# Patient Record
Sex: FEMALE | Race: BLACK OR AFRICAN AMERICAN | Employment: FULL TIME | URBAN - METROPOLITAN AREA
[De-identification: names, ages, dates, MRNs, and addresses within clinical notes are randomized per-mention and may not be internally consistent; named-entity substitution may affect disease eponyms.]

---

## 2017-12-14 ENCOUNTER — ED HISTORICAL/CONVERTED ENCOUNTER (OUTPATIENT)
Dept: OTHER | Age: 22
End: 2017-12-14

## 2018-04-30 ENCOUNTER — ED HISTORICAL/CONVERTED ENCOUNTER (OUTPATIENT)
Dept: OTHER | Age: 23
End: 2018-04-30

## 2018-11-17 ENCOUNTER — ED HISTORICAL/CONVERTED ENCOUNTER (OUTPATIENT)
Dept: OTHER | Age: 23
End: 2018-11-17

## 2018-12-27 ENCOUNTER — ED HISTORICAL/CONVERTED ENCOUNTER (OUTPATIENT)
Dept: OTHER | Age: 23
End: 2018-12-27

## 2019-02-12 ENCOUNTER — ED HISTORICAL/CONVERTED ENCOUNTER (OUTPATIENT)
Dept: OTHER | Age: 24
End: 2019-02-12

## 2019-06-15 ENCOUNTER — ED HISTORICAL/CONVERTED ENCOUNTER (OUTPATIENT)
Dept: OTHER | Age: 24
End: 2019-06-15

## 2019-12-04 ENCOUNTER — ED HISTORICAL/CONVERTED ENCOUNTER (OUTPATIENT)
Dept: OTHER | Age: 24
End: 2019-12-04

## 2020-02-16 ENCOUNTER — ED HISTORICAL/CONVERTED ENCOUNTER (OUTPATIENT)
Dept: OTHER | Age: 25
End: 2020-02-16

## 2020-05-18 ENCOUNTER — ED HISTORICAL/CONVERTED ENCOUNTER (OUTPATIENT)
Dept: OTHER | Age: 25
End: 2020-05-18

## 2020-06-01 ENCOUNTER — ED HISTORICAL/CONVERTED ENCOUNTER (OUTPATIENT)
Dept: OTHER | Age: 25
End: 2020-06-01

## 2020-06-09 ENCOUNTER — ED HISTORICAL/CONVERTED ENCOUNTER (OUTPATIENT)
Dept: OTHER | Age: 25
End: 2020-06-09

## 2020-07-06 ENCOUNTER — ED HISTORICAL/CONVERTED ENCOUNTER (OUTPATIENT)
Dept: OTHER | Age: 25
End: 2020-07-06

## 2020-08-19 ENCOUNTER — ED HISTORICAL/CONVERTED ENCOUNTER (OUTPATIENT)
Dept: OTHER | Age: 25
End: 2020-08-19

## 2020-09-03 ENCOUNTER — ED HISTORICAL/CONVERTED ENCOUNTER (OUTPATIENT)
Dept: OTHER | Age: 25
End: 2020-09-03

## 2020-10-07 ENCOUNTER — HOSPITAL ENCOUNTER (EMERGENCY)
Age: 25
Discharge: HOME | End: 2020-10-07
Attending: EMERGENCY MEDICINE
Payer: COMMERCIAL

## 2020-10-07 VITALS
OXYGEN SATURATION: 100 % | RESPIRATION RATE: 16 BRPM | HEART RATE: 86 BPM | DIASTOLIC BLOOD PRESSURE: 78 MMHG | BODY MASS INDEX: 40.18 KG/M2 | HEIGHT: 66 IN | SYSTOLIC BLOOD PRESSURE: 119 MMHG | WEIGHT: 250 LBS | TEMPERATURE: 98.4 F

## 2020-10-07 DIAGNOSIS — M54.50 LOW BACK PAIN, UNSPECIFIED BACK PAIN LATERALITY, UNSPECIFIED CHRONICITY, UNSPECIFIED WHETHER SCIATICA PRESENT: Primary | ICD-10-CM

## 2020-10-07 LAB
APPEARANCE UR: CLEAR
BACTERIA URNS QL MICRO: NEGATIVE /HPF
BILIRUB UR QL: NEGATIVE
COLOR UR: YELLOW
GLUCOSE UR STRIP.AUTO-MCNC: NEGATIVE MG/DL
HCG UR QL: NEGATIVE
HGB UR QL STRIP: NEGATIVE
KETONES UR QL STRIP.AUTO: NEGATIVE MG/DL
LEUKOCYTE ESTERASE UR QL STRIP.AUTO: NEGATIVE
NITRITE UR QL STRIP.AUTO: NEGATIVE
PH UR STRIP: 5 [PH] (ref 5–8)
PROT UR STRIP-MCNC: NEGATIVE MG/DL
RBC #/AREA URNS HPF: ABNORMAL /HPF (ref 0–5)
SP GR UR REFRACTOMETRY: 1.02 (ref 1–1.03)
UA: UC IF INDICATED,UAUC: ABNORMAL
UROBILINOGEN UR QL STRIP.AUTO: 0.1 EU/DL (ref 0.2–1)
WBC URNS QL MICRO: ABNORMAL /HPF (ref 0–4)

## 2020-10-07 PROCEDURE — 81025 URINE PREGNANCY TEST: CPT

## 2020-10-07 PROCEDURE — 99282 EMERGENCY DEPT VISIT SF MDM: CPT

## 2020-10-07 PROCEDURE — 81001 URINALYSIS AUTO W/SCOPE: CPT

## 2020-10-07 RX ORDER — IBUPROFEN 600 MG/1
600 TABLET ORAL
Qty: 20 TAB | Refills: 0 | Status: SHIPPED | OUTPATIENT
Start: 2020-10-07 | End: 2021-09-05

## 2020-10-08 NOTE — ED PROVIDER NOTES
EMERGENCY DEPARTMENT HISTORY AND PHYSICAL EXAM      Date: 10/7/2020  Patient Name: Ashley Fonseca    History of Presenting Illness     Chief Complaint   Patient presents with    Back Pain       History Provided By: Patient    HPI: Ashley Fonseca, 31 y.o. female with no significant past medical history who presents to the ED with cc of low back pain of insidious onset. No relieving factors or other constitutional symptoms. No dysuria or urinary frequency reported. There are no other complaints, changes, or physical findings at this time. PCP: None    No current facility-administered medications on file prior to encounter. No current outpatient medications on file prior to encounter. Past History     Past Medical History:  History reviewed. No pertinent past medical history. Past Surgical History:  Past Surgical History:   Procedure Laterality Date    HX ORTHOPAEDIC         Family History:  History reviewed. No pertinent family history. Social History:  Social History     Tobacco Use    Smoking status: Current Some Day Smoker    Smokeless tobacco: Never Used   Substance Use Topics    Alcohol use: Not on file    Drug use: Not Currently       Allergies: Allergies   Allergen Reactions    Shellfish Derived Swelling         Review of Systems     Review of Systems   Constitutional: Negative. HENT: Negative. Eyes: Negative. Respiratory: Negative. Cardiovascular: Negative. Gastrointestinal: Negative. Endocrine: Negative. Genitourinary: Negative. Musculoskeletal: Negative. Skin: Negative. Neurological: Negative. Hematological: Negative. Psychiatric/Behavioral: Negative. All other systems reviewed and are negative. Physical Exam     Physical Exam  Vitals signs and nursing note reviewed. Constitutional:       Appearance: Normal appearance. She is normal weight. HENT:      Head: Normocephalic and atraumatic.       Nose: Nose normal.      Mouth/Throat: Mouth: Mucous membranes are moist.      Pharynx: Oropharynx is clear. Eyes:      Extraocular Movements: Extraocular movements intact. Conjunctiva/sclera: Conjunctivae normal.      Pupils: Pupils are equal, round, and reactive to light. Neck:      Musculoskeletal: Normal range of motion and neck supple. Cardiovascular:      Rate and Rhythm: Normal rate and regular rhythm. Pulses: Normal pulses. Heart sounds: Normal heart sounds. Pulmonary:      Effort: Pulmonary effort is normal.      Breath sounds: Normal breath sounds. Abdominal:      General: Abdomen is flat. Palpations: Abdomen is soft. Musculoskeletal: Normal range of motion. Skin:     General: Skin is warm and dry. Capillary Refill: Capillary refill takes less than 2 seconds. Neurological:      General: No focal deficit present. Mental Status: She is alert and oriented to person, place, and time.    Psychiatric:         Mood and Affect: Mood normal.         Behavior: Behavior normal.         Diagnostic Study Results     Labs -  Recent Results (from the past 24 hour(s))   URINALYSIS W/ REFLEX CULTURE    Collection Time: 10/07/20  8:45 PM    Specimen: Urine   Result Value Ref Range    Color Yellow      Appearance Clear Clear      Specific gravity 1.020 1.003 - 1.030      pH (UA) 5.0 5.0 - 8.0      Protein Negative Negative mg/dL    Glucose Negative Negative mg/dL    Ketone Negative Negative mg/dL    Bilirubin Negative Negative      Blood Negative Negative      Urobilinogen 0.1 (L) 0.2 - 1.0 EU/dL    Nitrites Negative Negative      Leukocyte Esterase Negative Negative      WBC 0-4 0 - 4 /hpf    RBC 0-5 0 - 5 /hpf    Bacteria Negative Negative /hpf    UA:UC IF INDICATED Culture not indicated by UA result Culture not indicated by UA result     HCG URINE, QL    Collection Time: 10/07/20  8:45 PM   Result Value Ref Range    HCG urine, QL Negative Negative       Radiologic Studies -     CT Results  (Last 48 hours)    None CXR Results  (Last 48 hours)    None            Medical Decision Making   I am the first provider for this patient. I reviewed the vital signs, available nursing notes, past medical history, past surgical history, family history and social history. Vital Signs-Reviewed the patient's vital signs. No data found. Records Reviewed: Nursing Notes    Provider Notes (Medical Decision Making): Uneventful ED course, clinical improvement with therapy, patient will be discharged to followup with PCP as directed      ED Course:   Initial assessment performed. The patients presenting problems have been discussed, and they are in agreement with the care plan formulated and outlined with them. I have encouraged them to ask questions as they arise throughout their visit. PLAN:  1. Discharge Medication List as of 10/7/2020 11:46 PM        2. Follow-up Information    None       Return to ED if worse     Diagnosis     Clinical Impression:   1. Low back pain, unspecified back pain laterality, unspecified chronicity, unspecified whether sciatica present      Patients ED visit today does not show an emergent medical condition that requires inpatient admission; The diagnosis, treatment plan and the need for follow up have been discussed in detail with the patient and he has been given the opportunity to ask questions. All such questions at this time, have been satisfactorily answered; patient remains in stable condition for discharge.

## 2020-11-18 ENCOUNTER — APPOINTMENT (OUTPATIENT)
Dept: CT IMAGING | Age: 25
End: 2020-11-18
Attending: EMERGENCY MEDICINE
Payer: COMMERCIAL

## 2020-11-18 ENCOUNTER — APPOINTMENT (OUTPATIENT)
Dept: GENERAL RADIOLOGY | Age: 25
End: 2020-11-18
Attending: EMERGENCY MEDICINE
Payer: COMMERCIAL

## 2020-11-18 ENCOUNTER — HOSPITAL ENCOUNTER (EMERGENCY)
Age: 25
Discharge: HOME | End: 2020-11-19
Payer: COMMERCIAL

## 2020-11-18 VITALS
BODY MASS INDEX: 43.39 KG/M2 | RESPIRATION RATE: 18 BRPM | TEMPERATURE: 98.8 F | WEIGHT: 270 LBS | HEIGHT: 66 IN | SYSTOLIC BLOOD PRESSURE: 114 MMHG | HEART RATE: 79 BPM | OXYGEN SATURATION: 99 % | DIASTOLIC BLOOD PRESSURE: 65 MMHG

## 2020-11-18 DIAGNOSIS — M79.675 TOE PAIN, LEFT: ICD-10-CM

## 2020-11-18 DIAGNOSIS — S09.90XA CLOSED HEAD INJURY, INITIAL ENCOUNTER: Primary | ICD-10-CM

## 2020-11-18 DIAGNOSIS — R09.81 SINUS CONGESTION: ICD-10-CM

## 2020-11-18 LAB — HCG UR QL: NEGATIVE

## 2020-11-18 PROCEDURE — 99283 EMERGENCY DEPT VISIT LOW MDM: CPT

## 2020-11-18 PROCEDURE — 36415 COLL VENOUS BLD VENIPUNCTURE: CPT

## 2020-11-18 PROCEDURE — 74011250637 HC RX REV CODE- 250/637: Performed by: NURSE PRACTITIONER

## 2020-11-18 PROCEDURE — 81025 URINE PREGNANCY TEST: CPT

## 2020-11-18 PROCEDURE — 73630 X-RAY EXAM OF FOOT: CPT

## 2020-11-18 RX ORDER — ACETAMINOPHEN 500 MG
1000 TABLET ORAL ONCE
Status: COMPLETED | OUTPATIENT
Start: 2020-11-18 | End: 2020-11-18

## 2020-11-18 RX ORDER — SODIUM CHLORIDE 0.65 %
1 AEROSOL, SPRAY (ML) NASAL AS NEEDED
Qty: 15 ML | Refills: 0 | Status: SHIPPED | OUTPATIENT
Start: 2020-11-18

## 2020-11-18 RX ORDER — LORATADINE 10 MG
10 TABLET,DISINTEGRATING ORAL DAILY
Qty: 30 TAB | Refills: 0 | Status: SHIPPED | OUTPATIENT
Start: 2020-11-18 | End: 2020-12-18

## 2020-11-18 RX ADMIN — ACETAMINOPHEN 1000 MG: 500 TABLET, FILM COATED ORAL at 23:57

## 2020-11-18 NOTE — Clinical Note
[address] [place] [address] EMERGENCY DEPT 
[facility] [address] [address] 
[phone] Work/School Note Date: 11/18/2020 To Whom It May concern: 
 
West Curtis was seen and treated today in the emergency room by the following provider(s): 
Nurse Practitioner: Kate Dennis NP. West Curtis is excused from work/school on 11/19/20 and 11/20/20. She is medically clear to return to work/school on 11/21/2020. Sincerely, Dante Curtis NP

## 2020-11-19 RX ORDER — IBUPROFEN 800 MG/1
800 TABLET ORAL
Qty: 30 TAB | Refills: 0 | Status: SHIPPED | OUTPATIENT
Start: 2020-11-19 | End: 2021-09-05

## 2020-11-19 NOTE — ED PROVIDER NOTES
EMERGENCY DEPARTMENT HISTORY AND PHYSICAL EXAM      Date: 11/18/2020  Patient Name: Geno Connell    History of Presenting Illness     Chief Complaint   Patient presents with    Headache    Toe Pain    Fall       History Provided By: Patient    HPI: Geno Connell, 31 y.o. female with a past medical history significant No significant past medical history presents to the ED with cc of fall yesterday evening while in the shower. She reports hitting the left side of her head on the toilet and foot with pain to the fourth phalangeal.  She reports headache is intermittent 6/10, aching and throbbing. She also complains of pain in her left foot fourth toe with ambulation and when pressure is applied. Denies any loss of consciousness. Reports use of Tylenol with mild improvement. Denies any nausea, vomiting, dizziness, change in vision, or irritability. Denies any bleeding or noticing any swelling to the site. Patient also complains of having nasal congestion and sinus congestion requesting medication to help    There are no other complaints, changes, or physical findings at this time. PCP: None    No current facility-administered medications on file prior to encounter. Current Outpatient Medications on File Prior to Encounter   Medication Sig Dispense Refill    ibuprofen (MOTRIN) 600 mg tablet Take 1 Tab by mouth every six (6) hours as needed for Pain. 20 Tab 0       Past History     Past Medical History:  History reviewed. No pertinent past medical history. Past Surgical History:  Past Surgical History:   Procedure Laterality Date    HX ORTHOPAEDIC         Family History:  History reviewed. No pertinent family history. Social History:  Social History     Tobacco Use    Smoking status: Former Smoker    Smokeless tobacco: Never Used   Substance Use Topics    Alcohol use: Never     Frequency: Never    Drug use: Not Currently       Allergies:   Allergies   Allergen Reactions    Shellfish Derived Swelling         Review of Systems     Review of Systems   Constitutional: Negative for chills and fever. HENT: Positive for congestion. Gastrointestinal: Negative for nausea and vomiting. Musculoskeletal:        Toe pain   Skin: Negative. Neurological: Positive for headaches. Negative for dizziness, syncope and weakness. Psychiatric/Behavioral: Negative. Physical Exam     Physical Exam  Constitutional:       General: She is not in acute distress. Appearance: Normal appearance. She is not ill-appearing. HENT:      Head: Normocephalic and atraumatic. Nose: Nose normal.      Mouth/Throat:      Mouth: Mucous membranes are moist.   Eyes:      Extraocular Movements: Extraocular movements intact. Conjunctiva/sclera: Conjunctivae normal.   Neck:      Musculoskeletal: Normal range of motion and neck supple. Cardiovascular:      Rate and Rhythm: Normal rate and regular rhythm. Pulses: Normal pulses. Pulmonary:      Effort: Pulmonary effort is normal.   Musculoskeletal:         General: Tenderness present. No swelling. Feet:    Skin:     General: Skin is warm and dry. Capillary Refill: Capillary refill takes less than 2 seconds. Neurological:      Mental Status: She is alert and oriented to person, place, and time. Lab and Diagnostic Study Results     Labs -     Recent Results (from the past 12 hour(s))   HCG URINE, QL    Collection Time: 11/18/20  9:40 PM   Result Value Ref Range    HCG urine, QL Negative Negative         Radiologic Studies -   XR Results (most recent):  Results from Hospital Encounter encounter on 11/18/20   XR FOOT LT MIN 3 V    Narrative EXAMINATION: XR FOOT LT MIN 3 V    HISTORY: Left foot trauma    COMPARISON: Left ankle radiographs dated 9/3/2020    FINDINGS: 3 nonweightbearing view(s) of the left foot are submitted. Prior  internal fixation of the distal fibula.  Heterotopic ossification adjacent to  medial malleolus compatible with sequela of remote ligamentous injury. There is  no acute fracture or dislocation. Joint spaces are normal. There is a tiny heel  spur. Impression IMPRESSION:  1. No acute osseous abnormality of the left foot. CT Results  (Last 48 hours)    None        CXR Results  (Last 48 hours)    None            Medical Decision Making   - I am the first provider for this patient. - I reviewed the vital signs, available nursing notes, past medical history, past surgical history, family history and social history. - Initial assessment performed. The patients presenting problems have been discussed, and they are in agreement with the care plan formulated and outlined with them. I have encouraged them to ask questions as they arise throughout their visit. Vital Signs-Reviewed the patient's vital signs. Patient Vitals for the past 12 hrs:   Temp Pulse Resp BP SpO2   11/18/20 2121 98.8 °F (37.1 °C) 79 18 114/65 99 %       The patient presents with fall with a differential diagnosis of closed head injury, concussion, headache, intracranial bleed, fracture toe      ED Course:     ED Course as of Nov 19 0043   Thu Nov 19, 2020   0012 Pending xray results    [AM]      ED Course User Index  [AM] Hayley Parsons NP       Provider Notes (Medical Decision Making):     Head CT not warranted at this time. Patient denies any dizziness, nausea, vomiting, no hematoma noted no use of blood thinners and alert and oriented no neuro deficits noted. X-ray of foot negative for any fracture no ecchymosis or edema. active range of motion, 2+ pedal pulses 2+ popliteal pulses, less than 2-second capillary refill. Ambulating unable to bear weight. Patient advised of rice therapy be Profen Tylenol for pain management follow-up with PCP. Patient verbalized understanding stable at time of discharge.     Procedures   Medical Decision Makingedical Decision Making  Performed by: Evelina Cabezas NP      Disposition   Disposition: discharge    Discharged    DISCHARGE PLAN:  1. Current Discharge Medication List      CONTINUE these medications which have NOT CHANGED    Details   ibuprofen (MOTRIN) 600 mg tablet Take 1 Tab by mouth every six (6) hours as needed for Pain. Qty: 20 Tab, Refills: 0           2. Follow-up Information     Follow up With Specialties Details Why Contact Sean Hernandez MD Internal Medicine Schedule an appointment as soon as possible for a visit  If symptoms worsen, As needed [address]  [phone]          3. Return to ED if worse   4. Current Discharge Medication List      START taking these medications    Details   !! ibuprofen (MOTRIN) 800 mg tablet Take 1 Tab by mouth every eight (8) hours as needed for Pain. Qty: 30 Tab, Refills: 0      loratadine (CLARITIN REDITABS) 10 mg dissolvable tablet Take 1 Tab by mouth daily for 30 days. Qty: 30 Tab, Refills: 0      sodium chloride (Ayr Saline) 0.65 % nasal squeeze bottle 0.05 mL by Both Nostrils route as needed for Congestion. Qty: 15 mL, Refills: 0       !! - Potential duplicate medications found. Please discuss with provider. CONTINUE these medications which have NOT CHANGED    Details   !! ibuprofen (MOTRIN) 600 mg tablet Take 1 Tab by mouth every six (6) hours as needed for Pain. Qty: 20 Tab, Refills: 0       !! - Potential duplicate medications found. Please discuss with provider. Diagnosis     Clinical Impression:   1. Closed head injury, initial encounter    2. Sinus congestion    3. Toe pain, left        Attestations:    Mata Nails, NP    Please note that this dictation was completed with [employer], the computer voice recognition software. Quite often unanticipated grammatical, syntax, homophones, and other interpretive errors are inadvertently transcribed by the computer software. Please disregard these errors. Please excuse any errors that have escaped final proofreading.   Thank you.

## 2021-04-20 ENCOUNTER — HOSPITAL ENCOUNTER (EMERGENCY)
Age: 26
Discharge: HOME | End: 2021-04-20
Attending: EMERGENCY MEDICINE
Payer: COMMERCIAL

## 2021-04-20 ENCOUNTER — APPOINTMENT (OUTPATIENT)
Dept: CT IMAGING | Age: 26
End: 2021-04-20
Attending: EMERGENCY MEDICINE
Payer: COMMERCIAL

## 2021-04-20 ENCOUNTER — APPOINTMENT (OUTPATIENT)
Dept: GENERAL RADIOLOGY | Age: 26
End: 2021-04-20
Attending: EMERGENCY MEDICINE
Payer: COMMERCIAL

## 2021-04-20 VITALS
HEART RATE: 101 BPM | SYSTOLIC BLOOD PRESSURE: 130 MMHG | TEMPERATURE: 98.9 F | OXYGEN SATURATION: 99 % | HEIGHT: 66 IN | DIASTOLIC BLOOD PRESSURE: 75 MMHG | WEIGHT: 276 LBS | BODY MASS INDEX: 44.36 KG/M2 | RESPIRATION RATE: 18 BRPM

## 2021-04-20 DIAGNOSIS — S09.90XA CLOSED HEAD INJURY, INITIAL ENCOUNTER: Primary | ICD-10-CM

## 2021-04-20 DIAGNOSIS — S80.11XA CONTUSION OF RIGHT LOWER EXTREMITY, INITIAL ENCOUNTER: ICD-10-CM

## 2021-04-20 LAB — HCG UR QL: NEGATIVE

## 2021-04-20 PROCEDURE — 81025 URINE PREGNANCY TEST: CPT

## 2021-04-20 PROCEDURE — 73590 X-RAY EXAM OF LOWER LEG: CPT

## 2021-04-20 PROCEDURE — 99283 EMERGENCY DEPT VISIT LOW MDM: CPT

## 2021-04-20 PROCEDURE — 70450 CT HEAD/BRAIN W/O DYE: CPT

## 2021-04-20 NOTE — ED TRIAGE NOTES
Pt states fell off of a step stool at about 0800 this morning. States she hit her head on the counter as she was falling and hit her right leg. States she \"blacked out\" for a minute and has had a headache since the event. Also states her right shin/ankle area is sore to the touch.

## 2021-04-21 NOTE — ED PROVIDER NOTES
EMERGENCY DEPARTMENT HISTORY AND PHYSICAL EXAM      Date: 4/20/2021  Patient Name: Severa Alberts    History of Presenting Illness     Chief Complaint   Patient presents with    Leg Pain    Migraine    Head Injury       History Provided By: Patient    HPI: Severa Alberts, 31 y.o. female with a past medical history significant for left ankle fracture presents to the ED with cc of headache, left leg pain after fall at home this am.  Patient states she was cleaning her upper cabinets she fell off a stool striking her head against a countertop during the fall with positive loss of consciousness. She is had severe occipital headache all day unrelieved by ibuprofen. She also complains of pain in the right ankle which she believes she also struck against the countertop. She has been able to ambulate on the leg but states it is painful. He notes some intermittent dizziness but no visual change, nausea, vomiting, focal weakness or numbness. PCP: None    No current facility-administered medications on file prior to encounter. Current Outpatient Medications on File Prior to Encounter   Medication Sig Dispense Refill    ibuprofen (MOTRIN) 800 mg tablet Take 1 Tab by mouth every eight (8) hours as needed for Pain. 30 Tab 0    sodium chloride (Ayr Saline) 0.65 % nasal squeeze bottle 0.05 mL by Both Nostrils route as needed for Congestion. 15 mL 0    ibuprofen (MOTRIN) 600 mg tablet Take 1 Tab by mouth every six (6) hours as needed for Pain. 20 Tab 0       Past History     Past Medical History:  History reviewed. No pertinent past medical history. Past Surgical History:  Past Surgical History:   Procedure Laterality Date    HX ORTHOPAEDIC         Family History:  History reviewed. No pertinent family history.     Social History:  Social History     Tobacco Use    Smoking status: Former Smoker    Smokeless tobacco: Never Used   Substance Use Topics    Alcohol use: Never     Frequency: Never    Drug use: Not Currently       Allergies: Allergies   Allergen Reactions    Shellfish Derived Swelling         Review of Systems   Review of Systems   Cardiovascular: Negative for chest pain and palpitations. Gastrointestinal: Negative for abdominal pain. Musculoskeletal: Negative for gait problem. Neurological: Negative for weakness and numbness. All other systems reviewed and are negative. Physical Exam   Physical Exam  Vitals signs and nursing note reviewed. Constitutional:       Appearance: She is well-developed. HENT:      Head: Normocephalic and atraumatic. Mouth/Throat:      Mouth: Mucous membranes are moist.      Pharynx: Oropharynx is clear. Eyes:      General: No scleral icterus. Extraocular Movements: Extraocular movements intact. Pupils: Pupils are equal, round, and reactive to light. Neck:      Musculoskeletal: Normal range of motion. Comments: No midline spinal tenderness  Cardiovascular:      Rate and Rhythm: Normal rate and regular rhythm. Heart sounds: Normal heart sounds. Pulmonary:      Effort: Pulmonary effort is normal.      Breath sounds: Normal breath sounds. No wheezing, rhonchi or rales. Abdominal:      General: Bowel sounds are normal. There is no distension. Palpations: Abdomen is soft. Tenderness: There is no abdominal tenderness. There is no right CVA tenderness or left CVA tenderness. Musculoskeletal: Normal range of motion. General: No deformity. Comments: No midline spinal tenderness  Mild  TTP anterior aspect of right leg without contusion,  Edema or deformity   Skin:     General: Skin is warm and dry. Capillary Refill: Capillary refill takes less than 2 seconds. Findings: No bruising, lesion or rash. Neurological:      General: No focal deficit present. Mental Status: She is alert.    Psychiatric:         Mood and Affect: Mood normal.         Behavior: Behavior normal.         Diagnostic Study Results Labs -     Recent Results (from the past 12 hour(s))   HCG URINE, QL    Collection Time: 04/20/21  8:05 PM   Result Value Ref Range    HCG urine, QL Negative Negative         Radiologic Studies -   CT HEAD WO CONT   Final Result   No acute intracranial findings. XR TIB/FIB RT   Final Result   FINDINGS/IMPRESSION:   No evidence of displaced fracture or dislocation. If patient's pain persists,   recommend radiographic followup or nonemergent orthopedic consultation. CT Results  (Last 48 hours)               04/20/21 2057  CT HEAD WO CONT Final result    Impression:  No acute intracranial findings. Narrative:  Noncontrast CT of brain :  Axial images with multiplanar reformats. Dose Reduction:  All CT scans at this facility are performed using dose   reduction optimization techniques as appropriate to a performed exam including   the following: Automated exposure control, adjustments of the mA and/or kV   according to patient size, or use of iterative reconstruction technique. Comparison:  Head CT August 19, 2020       FINDINGS:   The posterior fossa is normal, with normal shape and size of the fourth   ventricle, the cerebellum and brainstem. Supratentorially, the lateral and third   ventricles are normal. The extra-axial CSF spaces are normal for age. There is   normal gray-white matter differentiation. There is no evidence for midline   shift, intracranial masses or hemorrhage. No definite evidence for acute   infarct. No fractures. Clear sinuses. CXR Results  (Last 48 hours)    None            Medical Decision Making   I am the first provider for this patient. I reviewed the vital signs, available nursing notes, past medical history, past surgical history, family history and social history. Vital Signs-Reviewed the patient's vital signs.   Patient Vitals for the past 12 hrs:   Temp Pulse Resp BP SpO2   04/20/21 1901 98.9 °F (37.2 °C) (!) 101 18 130/75 99 %       Records Reviewed: Nursing Notes    Provider Notes (Medical Decision Making):   Diff Dx: contusions,  Fracture,  ICH    ED Course:   Initial assessment performed. The patients presenting problems have been discussed, and they are in agreement with the care plan formulated and outlined with them. I have encouraged them to ask questions as they arise throughout their visit. CT Head and plain films R tib/fib nl  Pt reassured. Discussed care plan with OTC analgesics,  F/U PCP      PLAN:  1. Discharge Medication List as of 4/20/2021 10:22 PM        2. Follow-up Information     Follow up With Specialties Details [address]  Schedule an appointment as soon as possible for a visit   [address] [place] [address]  [phone]        Return to ED if worse     Diagnosis     Clinical Impression:   1. Closed head injury, initial encounter    2.  Contusion of right lower extremity, initial encounter

## 2021-08-17 ENCOUNTER — HOSPITAL ENCOUNTER (EMERGENCY)
Age: 26
Discharge: LEFT BEFORE DISCHARGE | End: 2021-08-17
Payer: COMMERCIAL

## 2021-08-17 VITALS
DIASTOLIC BLOOD PRESSURE: 78 MMHG | OXYGEN SATURATION: 99 % | BODY MASS INDEX: 44.36 KG/M2 | WEIGHT: 276 LBS | RESPIRATION RATE: 18 BRPM | HEART RATE: 80 BPM | SYSTOLIC BLOOD PRESSURE: 121 MMHG | HEIGHT: 66 IN | TEMPERATURE: 98.4 F

## 2021-08-17 PROCEDURE — 75810000275 HC EMERGENCY DEPT VISIT NO LEVEL OF CARE

## 2021-08-17 NOTE — ED TRIAGE NOTES
\" I have had a headache behind my eyes, nasal congestion, body aches, chills that started Sunday night \"

## 2021-09-05 ENCOUNTER — APPOINTMENT (OUTPATIENT)
Dept: GENERAL RADIOLOGY | Age: 26
End: 2021-09-05
Attending: EMERGENCY MEDICINE
Payer: COMMERCIAL

## 2021-09-05 ENCOUNTER — HOSPITAL ENCOUNTER (EMERGENCY)
Age: 26
Discharge: HOME | End: 2021-09-05
Attending: EMERGENCY MEDICINE
Payer: COMMERCIAL

## 2021-09-05 VITALS
TEMPERATURE: 98.7 F | SYSTOLIC BLOOD PRESSURE: 141 MMHG | HEIGHT: 66 IN | HEART RATE: 81 BPM | BODY MASS INDEX: 44.36 KG/M2 | OXYGEN SATURATION: 98 % | WEIGHT: 276 LBS | RESPIRATION RATE: 18 BRPM | DIASTOLIC BLOOD PRESSURE: 78 MMHG

## 2021-09-05 DIAGNOSIS — S16.1XXA CERVICAL MYOFASCIAL STRAIN, INITIAL ENCOUNTER: Primary | ICD-10-CM

## 2021-09-05 LAB — HCG UR QL: NEGATIVE

## 2021-09-05 PROCEDURE — 74011250637 HC RX REV CODE- 250/637: Performed by: EMERGENCY MEDICINE

## 2021-09-05 PROCEDURE — 71046 X-RAY EXAM CHEST 2 VIEWS: CPT

## 2021-09-05 PROCEDURE — 72050 X-RAY EXAM NECK SPINE 4/5VWS: CPT

## 2021-09-05 PROCEDURE — 74011250636 HC RX REV CODE- 250/636: Performed by: EMERGENCY MEDICINE

## 2021-09-05 PROCEDURE — 81025 URINE PREGNANCY TEST: CPT

## 2021-09-05 PROCEDURE — 99283 EMERGENCY DEPT VISIT LOW MDM: CPT

## 2021-09-05 PROCEDURE — 96372 THER/PROPH/DIAG INJ SC/IM: CPT

## 2021-09-05 RX ORDER — BACLOFEN 10 MG/1
10 TABLET ORAL 3 TIMES DAILY
Qty: 20 TABLET | Refills: 0 | Status: SHIPPED | OUTPATIENT
Start: 2021-09-05 | End: 2021-09-12

## 2021-09-05 RX ORDER — IBUPROFEN 600 MG/1
600 TABLET ORAL
Qty: 20 TABLET | Refills: 0 | Status: SHIPPED | OUTPATIENT
Start: 2021-09-05

## 2021-09-05 RX ORDER — IBUPROFEN 600 MG/1
600 TABLET ORAL
Status: COMPLETED | OUTPATIENT
Start: 2021-09-05 | End: 2021-09-05

## 2021-09-05 RX ORDER — ORPHENADRINE CITRATE 30 MG/ML
60 INJECTION INTRAMUSCULAR; INTRAVENOUS
Status: COMPLETED | OUTPATIENT
Start: 2021-09-05 | End: 2021-09-05

## 2021-09-05 RX ADMIN — IBUPROFEN 600 MG: 600 TABLET, FILM COATED ORAL at 08:35

## 2021-09-05 RX ADMIN — ORPHENADRINE CITRATE 60 MG: 60 INJECTION INTRAMUSCULAR; INTRAVENOUS at 08:35

## 2021-09-05 NOTE — DISCHARGE INSTRUCTIONS
Take medicines as prescribed and follow-up with your PCP in 2 to 3 days. Return to emergency room for any new or worsening symptoms. Avoid strenuous or heavy lifting greater than 10 pounds for the next 1 week.

## 2021-09-05 NOTE — LETTER
[facility] EMERGENCY DEPARTMENT  [address] [facility] [address] [address]  [phone]    Work/School Note    Date: 9/5/2021    To Whom It May concern:    Verona Hernandez was seen and treated today in the emergency room by the following provider(s):  Attending Provider: Andrzej Garland MD.      Verona Hernandez is excused from work/school on 09/05/21 and 09/06/21.       She is medically clear to return to work/school on 9/7/2021, no heavy lifting >10 pounds 7 days from 9/5/2021      Sincerely,          Behzad Aranda MD

## 2021-09-05 NOTE — ED PROVIDER NOTES
EMERGENCY DEPARTMENT HISTORY AND PHYSICAL EXAM      Date: 9/5/2021  Patient Name: Jessica Faria    History of Presenting Illness     Chief Complaint   Patient presents with    Headache    Neck Pain    Shoulder Pain       History Provided By: Patient    HPI: Jessica Faria, 31 y.o. female with No significant past medical history presents to the ED with cc of intermittent neck pain and headache of insidious onset of few days duration. Patient does a fair amount of heavy lifting at work. She also recently had Covid diagnosed August 14, 2021. Patient has had her first shot of her Covid vaccine so far. No fever or constitutional symptoms at this time. No other known COVID-19 contacts. No relieving or aggravating factors. There are no other complaints, changes, or physical findings at this time. PCP: None    No current facility-administered medications on file prior to encounter. Current Outpatient Medications on File Prior to Encounter   Medication Sig Dispense Refill    sodium chloride (Ayr Saline) 0.65 % nasal squeeze bottle 0.05 mL by Both Nostrils route as needed for Congestion. 15 mL 0       Past History     Past Medical History:  History reviewed. No pertinent past medical history. Past Surgical History:  Past Surgical History:   Procedure Laterality Date    HX ORTHOPAEDIC         Family History:  History reviewed. No pertinent family history. Social History:  Social History     Tobacco Use    Smoking status: Former Smoker    Smokeless tobacco: Never Used   Substance Use Topics    Alcohol use: Never    Drug use: Not Currently       Allergies: Allergies   Allergen Reactions    Shellfish Derived Swelling         Review of Systems     Review of Systems   Constitutional: Negative for diaphoresis and fatigue. HENT: Negative for congestion, dental problem, ear discharge and ear pain. Eyes: Negative for discharge and redness.    Respiratory: Negative for cough, chest tightness and shortness of breath. Cardiovascular: Negative for chest pain and palpitations. Gastrointestinal: Negative for abdominal pain, constipation, diarrhea, nausea and vomiting. Endocrine: Negative. Genitourinary: Negative. Negative for dysuria and frequency. Musculoskeletal: Positive for myalgias and neck pain. Negative for arthralgias. Skin: Negative. Neurological: Negative for dizziness, syncope and light-headedness. Hematological: Negative. Psychiatric/Behavioral: Negative for agitation and behavioral problems. All other systems reviewed and are negative. Physical Exam     Physical Exam  Vitals and nursing note reviewed. Constitutional:       Appearance: Normal appearance. She is normal weight. HENT:      Head: Normocephalic and atraumatic. Nose: Nose normal.      Mouth/Throat:      Mouth: Mucous membranes are moist.      Pharynx: Oropharynx is clear. Eyes:      Extraocular Movements: Extraocular movements intact. Conjunctiva/sclera: Conjunctivae normal.      Pupils: Pupils are equal, round, and reactive to light. Cardiovascular:      Rate and Rhythm: Normal rate and regular rhythm. Pulses: Normal pulses. Heart sounds: Normal heart sounds. Pulmonary:      Effort: Pulmonary effort is normal.      Breath sounds: Normal breath sounds. Abdominal:      General: Abdomen is flat. Palpations: Abdomen is soft. Musculoskeletal:         General: Normal range of motion. Cervical back: Normal range of motion and neck supple. Skin:     General: Skin is warm and dry. Capillary Refill: Capillary refill takes less than 2 seconds. Neurological:      General: No focal deficit present. Mental Status: She is alert and oriented to person, place, and time.    Psychiatric:         Mood and Affect: Mood normal.         Behavior: Behavior normal.         Lab and Diagnostic Study Results     Labs -   No results found for this or any previous visit (from the past 12 hour(s)). Radiologic Studies -     CT Results  (Last 48 hours)    None        CXR Results  (Last 48 hours)               09/05/21 0923  XR CHEST PA LAT Final result    Impression:  No acute process. Narrative:  Chest, 2 views. No comparisons. The heart, mediastinum, and pulmonary vasculature appear within normal limits. No evidence of pulmonary edema, air space pneumonia, or pleural effusion. No   evidence of pneumothorax. Study Result    Narrative & Impression   Cervical spine, 6 views     AP, lateral, swimmer's, odontoid, and bilateral oblique views     IMPRESSION  No evidence of acute cervical fracture or subluxation. Normal  appearance of epiglottis, airway, and precervical soft tissues. Adenoids appear  prominent. Alateen tonsils also appear slightly prominent. No evidence of  osseous spinal or neural foraminal narrowing. Medical Decision Making   - I am the first provider for this patient. - I reviewed the vital signs, available nursing notes, past medical history, past surgical history, family history and social history. - Initial assessment performed. The patients presenting problems have been discussed, and they are in agreement with the care plan formulated and outlined with them. I have encouraged them to ask questions as they arise throughout their visit. Vital Signs-Reviewed the patient's vital signs. No data found. Records Reviewed: Nursing Notes    ED Course/Provider Notes (Medical Decision Making): Uneventful ED course, clinical improvement with therapy, patient will be discharged to followup with PCP as directed    Disposition     Disposition: Condition stable and improved  DC- Adult Discharges: All of the diagnostic tests were reviewed and questions answered. Diagnosis, care plan and treatment options were discussed. The patient understands the instructions and will follow up as directed.  The patients results have been reviewed with them. They have been counseled regarding their diagnosis. The patient verbally convey understanding and agreement of the signs, symptoms, diagnosis, treatment and prognosis and additionally agrees to follow up as recommended with their PCP in 24 - 48 hours. They also agree with the care-plan and convey that all of their questions have been answered. I have also put together some discharge instructions for them that include: 1) educational information regarding their diagnosis, 2) how to care for their diagnosis at home, as well a 3) list of reasons why they would want to return to the ED prior to their follow-up appointment, should their condition change. Discharged    DISCHARGE PLAN:  1. Current Discharge Medication List      CONTINUE these medications which have NOT CHANGED    Details   sodium chloride (Ayr Saline) 0.65 % nasal squeeze bottle 0.05 mL by Both Nostrils route as needed for Congestion. Qty: 15 mL, Refills: 0           2. Follow-up Information     Follow up With Specialties Details Why Contact Info    Follow-up with PCP of your choice in 2 to 3 days. 3.  Return to ED if worse   4. Discharge Medication List as of 9/5/2021 10:01 AM      START taking these medications    Details   baclofen (LIORESAL) 10 mg tablet Take 1 Tablet by mouth three (3) times daily for 20 doses. , Normal, Disp-20 Tablet, R-0      ibuprofen (MOTRIN) 600 mg tablet Take 1 Tablet by mouth every six (6) hours as needed for Pain., Normal, Disp-20 Tablet, R-0         CONTINUE these medications which have NOT CHANGED    Details   sodium chloride (Ayr Saline) 0.65 % nasal squeeze bottle 0.05 mL by Both Nostrils route as needed for Congestion. , Normal, Disp-15 mL,R-0               Diagnosis     Clinical Impression:   1.  Cervical myofascial strain, initial encounter        Attestations:    Thomas Shearer MD    Please note that this dictation was completed with [employer], the computer voice recognition software. Quite often unanticipated grammatical, syntax, homophones, and other interpretive errors are inadvertently transcribed by the computer software. Please disregard these errors. Please excuse any errors that have escaped final proofreading. Thank you.

## 2021-09-05 NOTE — ED TRIAGE NOTES
\" I dont know what is going on but my left shoulder and neck I cant turn without it hurting, and its causing a headache \"

## 2021-09-05 NOTE — ED NOTES
Patient reports she recently came off of quarantine from having COVID, has not had a negative test since. Feels better than she did previously but still has some shortness of breath when doing some activities.

## 2023-03-23 ENCOUNTER — TELEPHONE (OUTPATIENT)
Dept: INTERNAL MEDICINE CLINIC | Age: 28
End: 2023-03-23

## 2023-03-23 NOTE — TELEPHONE ENCOUNTER
----- Message from Tomasz Amaral sent at 3/16/2023 11:26 AM EDT -----  Subject: Message to Provider    QUESTIONS  Information for Provider? Valencia with [employer] called to confirm   if check number [id number] for the amount of $285.00 has been received. The   check was written out to [address]. Karen Cassidy. Please Valencia call with   update to [phone].  ---------------------------------------------------------------------------  --------------  Carol Noble INFO  [phone]; OK to leave message on voicemail  ---------------------------------------------------------------------------  --------------  SCRIPT ANSWERS  Relationship to Patient? Third Party  Third Party Type? Other  Other Third Party Type? [employer]  Representative Name?  [facility]

## 2023-06-05 LAB
CREATININE, EXTERNAL: 0.65
HBA1C MFR BLD HPLC: 5.8 %
LDL CHOLESTEROL, EXTERNAL: 149
TOTAL CHOLESTEROL, EXTERNAL: 206

## 2024-11-20 ENCOUNTER — HOSPITAL ENCOUNTER (EMERGENCY)
Facility: HOSPITAL | Age: 29
Discharge: HOME | End: 2024-11-20
Payer: COMMERCIAL

## 2024-11-20 VITALS
SYSTOLIC BLOOD PRESSURE: 155 MMHG | TEMPERATURE: 98.4 F | RESPIRATION RATE: 16 BRPM | BODY MASS INDEX: 43.71 KG/M2 | WEIGHT: 272 LBS | DIASTOLIC BLOOD PRESSURE: 97 MMHG | OXYGEN SATURATION: 100 % | HEART RATE: 75 BPM | HEIGHT: 66 IN
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
## 2024-11-20 PROCEDURE — 87389 HIV-1 AG W/HIV-1&-2 AB AG IA: CPT

## 2024-11-20 PROCEDURE — 86706 HEP B SURFACE ANTIBODY: CPT

## 2024-11-20 PROCEDURE — 87340 HEPATITIS B SURFACE AG IA: CPT

## 2024-11-20 PROCEDURE — 99283 EMERGENCY DEPT VISIT LOW MDM: CPT

## 2024-11-20 PROCEDURE — 36415 COLL VENOUS BLD VENIPUNCTURE: CPT

## 2024-11-20 PROCEDURE — 86803 HEPATITIS C AB TEST: CPT

## 2024-11-20 ASSESSMENT — LIFESTYLE VARIABLES
HOW MANY STANDARD DRINKS CONTAINING ALCOHOL DO YOU HAVE ON A TYPICAL DAY: PATIENT DOES NOT DRINK
HOW OFTEN DO YOU HAVE A DRINK CONTAINING ALCOHOL: NEVER

## 2024-11-20 ASSESSMENT — PAIN - FUNCTIONAL ASSESSMENT
PAIN_FUNCTIONAL_ASSESSMENT: 0-10
PAIN_FUNCTIONAL_ASSESSMENT: 0-10

## 2024-11-20 ASSESSMENT — PAIN SCALES - GENERAL
PAINLEVEL_OUTOF10: 0
PAINLEVEL_OUTOF10: 0

## 2024-11-21 LAB
HBV SURFACE AB SER QL: REACTIVE
HBV SURFACE AB SER-ACNC: 432.76 MIU/ML
HBV SURFACE AG SER QL: 0.16 INDEX
HBV SURFACE AG SER QL: NEGATIVE
HCV AB SER IA-ACNC: 0.17 INDEX
HCV AB SERPL QL IA: NONREACTIVE
HIV 1+2 AB+HIV1 P24 AG SERPL QL IA: NONREACTIVE
HIV 1/2 RESULT COMMENT: NORMAL

## 2024-11-21 NOTE — ED TRIAGE NOTES
Pt came in after needle stick at this morning, when she stuck patient and then stuck her 2nd digit on right hand. Pt got stuck at 1135 this morning.

## 2024-11-21 NOTE — ED PROVIDER NOTES
Resource Strain: Not on file   Food Insecurity: Not on file   Transportation Needs: Not on file   Physical Activity: Not on file   Stress: Not on file   Social Connections: Not on file   Intimate Partner Violence: Not on file   Depression: Not on file   Housing Stability: Not on file   Interpersonal Safety: Not on file   Utilities: Not on file       PHYSICAL EXAM   Physical Exam  Vitals and nursing note reviewed.   Constitutional:       General: She is not in acute distress.     Appearance: Normal appearance. She is not ill-appearing.   HENT:      Head: Normocephalic and atraumatic.   Cardiovascular:      Rate and Rhythm: Normal rate and regular rhythm.      Pulses: Normal pulses.      Heart sounds: Normal heart sounds.   Pulmonary:      Effort: Pulmonary effort is normal. No respiratory distress.      Breath sounds: Normal breath sounds. No wheezing.   Musculoskeletal:      Comments: Small pinpoint area on the right finger indicative of needlestick.   Skin:     General: Skin is warm.      Capillary Refill: Capillary refill takes less than 2 seconds.   Neurological:      General: No focal deficit present.      Mental Status: She is alert and oriented to person, place, and time.       SCREENINGS                  LAB, EKG AND DIAGNOSTIC RESULTS   Labs:  No results found for this or any previous visit (from the past 12 hour(s)).    EKG: Not Applicable    Radiologic Studies:  Non-plain film images such as CT, Ultrasound and MRI are read by the radiologist. Plain radiographic images are visualized and preliminarily interpreted by the ED Physician with the following findings: Not Applicable.    Interpretation per the Radiologist below, if available at the time of this note:  No orders to display        ED COURSE and DIFFERENTIAL DIAGNOSIS/MDM   8:30 PM Differential and Considerations:     Patient presents for evaluation after needlestick injury at work.  Patient states that the person who is blood was on the needle has

## 2025-02-22 ENCOUNTER — HOSPITAL ENCOUNTER (EMERGENCY)
Facility: HOSPITAL | Age: 30
Discharge: HOME | End: 2025-02-22

## 2025-02-22 VITALS
RESPIRATION RATE: 16 BRPM | HEART RATE: 97 BPM | TEMPERATURE: 100.2 F | SYSTOLIC BLOOD PRESSURE: 129 MMHG | DIASTOLIC BLOOD PRESSURE: 86 MMHG | OXYGEN SATURATION: 100 %

## 2025-02-22 DIAGNOSIS — J06.9 VIRAL URI WITH COUGH: Primary | ICD-10-CM

## 2025-02-22 DIAGNOSIS — M79.10 MYALGIA: ICD-10-CM

## 2025-02-22 LAB
FLUAV RNA SPEC QL NAA+PROBE: DETECTED
FLUBV RNA SPEC QL NAA+PROBE: NOT DETECTED
SARS-COV-2 RNA RESP QL NAA+PROBE: NOT DETECTED

## 2025-02-22 PROCEDURE — 99283 EMERGENCY DEPT VISIT LOW MDM: CPT

## 2025-02-22 PROCEDURE — 6370000000 HC RX 637 (ALT 250 FOR IP)

## 2025-02-22 PROCEDURE — 87636 SARSCOV2 & INF A&B AMP PRB: CPT

## 2025-02-22 RX ORDER — ONDANSETRON 4 MG/1
4 TABLET, ORALLY DISINTEGRATING ORAL ONCE
Status: COMPLETED | OUTPATIENT
Start: 2025-02-22 | End: 2025-02-22

## 2025-02-22 RX ORDER — IBUPROFEN 600 MG/1
600 TABLET, FILM COATED ORAL
Status: COMPLETED | OUTPATIENT
Start: 2025-02-22 | End: 2025-02-22

## 2025-02-22 RX ORDER — DEXTROMETHORPHAN HYDROBROMIDE AND PROMETHAZINE HYDROCHLORIDE 15; 6.25 MG/5ML; MG/5ML
5 SYRUP ORAL 4 TIMES DAILY PRN
Qty: 118 ML | Refills: 0 | Status: SHIPPED | OUTPATIENT
Start: 2025-02-22

## 2025-02-22 RX ORDER — IBUPROFEN 600 MG/1
600 TABLET, FILM COATED ORAL 3 TIMES DAILY PRN
Qty: 30 TABLET | Refills: 0 | Status: SHIPPED | OUTPATIENT
Start: 2025-02-22

## 2025-02-22 RX ORDER — ACETAMINOPHEN 500 MG
1000 TABLET ORAL
Status: COMPLETED | OUTPATIENT
Start: 2025-02-22 | End: 2025-02-22

## 2025-02-22 RX ORDER — ACETAMINOPHEN 500 MG
1000 TABLET ORAL 3 TIMES DAILY PRN
Qty: 30 TABLET | Refills: 0 | Status: SHIPPED | OUTPATIENT
Start: 2025-02-22 | End: 2025-03-04

## 2025-02-22 RX ORDER — ONDANSETRON 4 MG/1
4 TABLET, ORALLY DISINTEGRATING ORAL 3 TIMES DAILY PRN
Qty: 21 TABLET | Refills: 0 | Status: SHIPPED | OUTPATIENT
Start: 2025-02-22

## 2025-02-22 RX ADMIN — IBUPROFEN 600 MG: 600 TABLET, FILM COATED ORAL at 10:53

## 2025-02-22 RX ADMIN — ONDANSETRON 4 MG: 4 TABLET, ORALLY DISINTEGRATING ORAL at 10:53

## 2025-02-22 RX ADMIN — ACETAMINOPHEN 1000 MG: 500 TABLET, FILM COATED ORAL at 10:53

## 2025-02-22 ASSESSMENT — PAIN SCALES - GENERAL: PAINLEVEL_OUTOF10: 10

## 2025-02-22 ASSESSMENT — PAIN - FUNCTIONAL ASSESSMENT: PAIN_FUNCTIONAL_ASSESSMENT: 0-10

## 2025-02-22 NOTE — ED TRIAGE NOTES
Fevers/body aches, can't sleep because she is too uncomfortable, been going for 2-3 days, v/s as noted. Most of the body pain is in the abd. Has cough and congestion as well. Also complaining of nausea but has not vomited, states she is constipated as well.

## 2025-02-22 NOTE — ED PROVIDER NOTES
[facility] EMERGENCY DEPT  EMERGENCY DEPARTMENT HISTORY AND PHYSICAL EXAM      Date: 2/22/2025  Patient Name: Breanna Howard  MRN: [id number]  YOB: 1995  Date of evaluation: 2/22/2025  Provider: Alfa Angeles PA-C   Note Started: 10:33 AM EST 2/22/25    HISTORY OF PRESENT ILLNESS     Chief Complaint   Patient presents with    Cold Symptoms    Abdominal Pain       History Provided By: Patient    HPI: Breanna Howard is a 29 y.o. female with no other medical history presents emergency department for evaluation of sudden onset fevers, cough, congestion subjective chills body aches and occasional nausea without vomiting.  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  Reports mild decrease in bowel movement frequency.  Denies any chest pain shortness of breath painful urination or diarrhea.     PAST MEDICAL HISTORY   Past Medical History:  No past medical history on file.    Past Surgical History:  Past Surgical History:   Procedure Laterality Date    ORTHOPEDIC SURGERY         Family History:  No family history on file.    Social History:  Social History     Tobacco Use    Smoking status: Former    Smokeless tobacco: Never   Substance Use Topics    Alcohol use: Never    Drug use: Not Currently       Allergies:  Allergies   Allergen Reactions    Shellfish Allergy Swelling       PCP: No primary care provider on file.    Current Meds:   No current facility-administered medications for this encounter.     Current Outpatient Medications   Medication Sig Dispense Refill    acetaminophen (TYLENOL) 500 MG tablet Take 2 tablets by mouth 3 times daily as needed for Pain 30 tablet 0    ibuprofen (ADVIL;MOTRIN) 600 MG tablet Take 1 tablet by mouth 3 times daily as needed for Pain 30 tablet 0    promethazine-dextromethorphan (PROMETHAZINE-DM) 6.25-15 MG/5ML syrup Take 5 mLs by mouth 4 times daily as needed for Cough 118 mL 0    ondansetron (ZOFRAN-ODT) 4 MG disintegrating tablet Take 1